# Patient Record
Sex: MALE | Race: WHITE | Employment: UNEMPLOYED | URBAN - METROPOLITAN AREA
[De-identification: names, ages, dates, MRNs, and addresses within clinical notes are randomized per-mention and may not be internally consistent; named-entity substitution may affect disease eponyms.]

---

## 2020-02-17 ENCOUNTER — OFFICE VISIT (OUTPATIENT)
Dept: INTERNAL MEDICINE CLINIC | Facility: CLINIC | Age: 25
End: 2020-02-17
Payer: COMMERCIAL

## 2020-02-17 VITALS
HEART RATE: 76 BPM | DIASTOLIC BLOOD PRESSURE: 58 MMHG | SYSTOLIC BLOOD PRESSURE: 110 MMHG | HEIGHT: 64.5 IN | WEIGHT: 169.69 LBS | BODY MASS INDEX: 28.62 KG/M2

## 2020-02-17 DIAGNOSIS — L72.3 SEBACEOUS CYST: Primary | ICD-10-CM

## 2020-02-17 PROCEDURE — 99202 OFFICE O/P NEW SF 15 MIN: CPT | Performed by: INTERNAL MEDICINE

## 2020-02-17 RX ORDER — SULFAMETHOXAZOLE AND TRIMETHOPRIM 800; 160 MG/1; MG/1
1 TABLET ORAL 2 TIMES DAILY
Qty: 10 TABLET | Refills: 0 | Status: SHIPPED | OUTPATIENT
Start: 2020-02-17 | End: 2020-02-22

## 2020-02-17 NOTE — PATIENT INSTRUCTIONS
Please apply warm compress to the cyst 3 times daily. Take generic Bactrim twice daily for 5 days. Schedule an appointment with General Surgery.

## 2020-02-17 NOTE — PROGRESS NOTES
Kermit Zimmer is a 22year old male. Patient presents with:  Bump: lower back    HPI:   For approximately 3 years he has had a persistent \"cyst\" just to the right of his tailbone.   Recently size seems to have increased, and he notices some te

## 2020-02-24 ENCOUNTER — OFFICE VISIT (OUTPATIENT)
Dept: SURGERY | Facility: CLINIC | Age: 25
End: 2020-02-24
Payer: COMMERCIAL

## 2020-02-24 VITALS — WEIGHT: 169 LBS | BODY MASS INDEX: 29 KG/M2

## 2020-02-24 DIAGNOSIS — L05.01 PILONIDAL CYST WITH ABSCESS: Primary | ICD-10-CM

## 2020-02-24 PROCEDURE — 99244 OFF/OP CNSLTJ NEW/EST MOD 40: CPT | Performed by: SURGERY

## 2020-02-24 PROCEDURE — 10080 I&D PILONIDAL CYST SIMPLE: CPT | Performed by: SURGERY

## 2020-02-25 NOTE — PROGRESS NOTES
History and Physical      Harry Tierney is a 22year old male. HPI   Patient presents with:  Cyst: Sebaceous Cyst on the right buttock. Has had for 3 - 4 years. This is the first time it became infected.   Applied warm compresses 2 times gallups, or rubs  Abdomen: soft non-tender non-distended no organomegaly noted no masses  Extremities: no edema, cyanosis, or clubbing  Neurological: exam appropriate for age reflexes and motor skills appropriate for age   Skin:  R upper sacral abscess - m

## 2020-03-09 ENCOUNTER — OFFICE VISIT (OUTPATIENT)
Dept: SURGERY | Facility: CLINIC | Age: 25
End: 2020-03-09
Payer: COMMERCIAL

## 2020-03-09 VITALS — HEIGHT: 65 IN | TEMPERATURE: 99 F | BODY MASS INDEX: 28.66 KG/M2 | WEIGHT: 172 LBS

## 2020-03-09 DIAGNOSIS — L05.01 PILONIDAL CYST WITH ABSCESS: Primary | ICD-10-CM

## 2020-03-09 PROCEDURE — 99024 POSTOP FOLLOW-UP VISIT: CPT | Performed by: SURGERY

## 2020-03-09 NOTE — PROGRESS NOTES
Postoperative Patient Follow-up      3/9/2020    Kiran Holland 22year old      HPI  Patient presents with: Follow - Up: Follow up I & D Right buttock. Patient still packing everyday. Patient reports possible fever and still draining.       Pa

## 2021-05-14 ENCOUNTER — OFFICE VISIT (OUTPATIENT)
Dept: INTERNAL MEDICINE CLINIC | Facility: CLINIC | Age: 26
End: 2021-05-14
Payer: COMMERCIAL

## 2021-05-14 ENCOUNTER — LAB ENCOUNTER (OUTPATIENT)
Dept: LAB | Facility: HOSPITAL | Age: 26
End: 2021-05-14
Attending: INTERNAL MEDICINE
Payer: COMMERCIAL

## 2021-05-14 VITALS
BODY MASS INDEX: 26.85 KG/M2 | SYSTOLIC BLOOD PRESSURE: 105 MMHG | WEIGHT: 161.19 LBS | HEIGHT: 65 IN | DIASTOLIC BLOOD PRESSURE: 70 MMHG | HEART RATE: 109 BPM

## 2021-05-14 DIAGNOSIS — Z11.3 SCREEN FOR STD (SEXUALLY TRANSMITTED DISEASE): ICD-10-CM

## 2021-05-14 DIAGNOSIS — L72.3 SEBACEOUS CYST: Primary | ICD-10-CM

## 2021-05-14 PROCEDURE — 3008F BODY MASS INDEX DOCD: CPT | Performed by: INTERNAL MEDICINE

## 2021-05-14 PROCEDURE — 3078F DIAST BP <80 MM HG: CPT | Performed by: INTERNAL MEDICINE

## 2021-05-14 PROCEDURE — 87340 HEPATITIS B SURFACE AG IA: CPT

## 2021-05-14 PROCEDURE — 3074F SYST BP LT 130 MM HG: CPT | Performed by: INTERNAL MEDICINE

## 2021-05-14 PROCEDURE — 86780 TREPONEMA PALLIDUM: CPT

## 2021-05-14 PROCEDURE — 87389 HIV-1 AG W/HIV-1&-2 AB AG IA: CPT

## 2021-05-14 PROCEDURE — 86803 HEPATITIS C AB TEST: CPT

## 2021-05-14 PROCEDURE — 86706 HEP B SURFACE ANTIBODY: CPT

## 2021-05-14 PROCEDURE — 87491 CHLMYD TRACH DNA AMP PROBE: CPT

## 2021-05-14 PROCEDURE — 36415 COLL VENOUS BLD VENIPUNCTURE: CPT

## 2021-05-14 PROCEDURE — 87591 N.GONORRHOEAE DNA AMP PROB: CPT

## 2021-05-14 PROCEDURE — 99213 OFFICE O/P EST LOW 20 MIN: CPT | Performed by: INTERNAL MEDICINE

## 2021-05-14 NOTE — PATIENT INSTRUCTIONS
Please see Dr. Renny Cates if you would like your scalp cyst removed. Await results of today's blood and urine testing.

## 2021-05-14 NOTE — PROGRESS NOTES
Madi Cedillo is a 26year old male. Patient presents with:  Lump: pt presents today for small lump on head. Std Screen    HPI:   Jose Cruzmaxwell Theodore presents this morning with two concerns.     For approximately 2 years, he has had an enlarging lump on his surface antigen and surface antibody, and urine for GC/chlamydia today. Orders sent. Recommend regular condom use. - HIV AG AB COMBO; Future  - T PALLIDUM SCREENING CASCADE; Future  - HCV ANTIBODY; Future  - HEPATITIS B SURFACE ANTIGEN;  Future  - HEPATI